# Patient Record
Sex: MALE | Race: WHITE | NOT HISPANIC OR LATINO | Employment: FULL TIME | URBAN - METROPOLITAN AREA
[De-identification: names, ages, dates, MRNs, and addresses within clinical notes are randomized per-mention and may not be internally consistent; named-entity substitution may affect disease eponyms.]

---

## 2023-06-12 ENCOUNTER — LAB (OUTPATIENT)
Dept: LAB | Facility: LAB | Age: 63
End: 2023-06-12
Payer: COMMERCIAL

## 2023-06-12 ENCOUNTER — OFFICE VISIT (OUTPATIENT)
Dept: PRIMARY CARE | Facility: CLINIC | Age: 63
End: 2023-06-12
Payer: COMMERCIAL

## 2023-06-12 VITALS
SYSTOLIC BLOOD PRESSURE: 138 MMHG | HEART RATE: 68 BPM | HEIGHT: 74 IN | BODY MASS INDEX: 29.63 KG/M2 | WEIGHT: 230.9 LBS | DIASTOLIC BLOOD PRESSURE: 84 MMHG

## 2023-06-12 DIAGNOSIS — E78.49 OTHER HYPERLIPIDEMIA: ICD-10-CM

## 2023-06-12 DIAGNOSIS — R73.03 PREDIABETES: Primary | ICD-10-CM

## 2023-06-12 DIAGNOSIS — I10 ESSENTIAL HYPERTENSION: ICD-10-CM

## 2023-06-12 DIAGNOSIS — R73.03 PREDIABETES: ICD-10-CM

## 2023-06-12 PROBLEM — N40.0 BENIGN PROSTATIC HYPERPLASIA WITHOUT LOWER URINARY TRACT SYMPTOMS: Status: ACTIVE | Noted: 2023-06-12

## 2023-06-12 PROBLEM — H43.812 POSTERIOR VITREOUS DETACHMENT OF LEFT EYE: Status: ACTIVE | Noted: 2020-09-15

## 2023-06-12 PROBLEM — E78.5 HYPERLIPIDEMIA: Status: ACTIVE | Noted: 2021-02-03

## 2023-06-12 PROBLEM — H43.812 POSTERIOR VITREOUS DETACHMENT OF LEFT EYE: Status: RESOLVED | Noted: 2020-09-15 | Resolved: 2023-06-12

## 2023-06-12 PROBLEM — R45.89 FEELING DOWN: Status: ACTIVE | Noted: 2023-06-12

## 2023-06-12 LAB
ANION GAP IN SER/PLAS: 10 MMOL/L (ref 10–20)
CALCIUM (MG/DL) IN SER/PLAS: 9.5 MG/DL (ref 8.6–10.3)
CARBON DIOXIDE, TOTAL (MMOL/L) IN SER/PLAS: 28 MMOL/L (ref 21–32)
CHLORIDE (MMOL/L) IN SER/PLAS: 103 MMOL/L (ref 98–107)
CREATININE (MG/DL) IN SER/PLAS: 1.27 MG/DL (ref 0.5–1.3)
GFR MALE: 64 ML/MIN/1.73M2
GLUCOSE (MG/DL) IN SER/PLAS: 107 MG/DL (ref 74–99)
POTASSIUM (MMOL/L) IN SER/PLAS: 4.2 MMOL/L (ref 3.5–5.3)
SODIUM (MMOL/L) IN SER/PLAS: 137 MMOL/L (ref 136–145)
UREA NITROGEN (MG/DL) IN SER/PLAS: 23 MG/DL (ref 6–23)

## 2023-06-12 PROCEDURE — 3075F SYST BP GE 130 - 139MM HG: CPT | Performed by: FAMILY MEDICINE

## 2023-06-12 PROCEDURE — 80048 BASIC METABOLIC PNL TOTAL CA: CPT

## 2023-06-12 PROCEDURE — 83036 HEMOGLOBIN GLYCOSYLATED A1C: CPT

## 2023-06-12 PROCEDURE — 36415 COLL VENOUS BLD VENIPUNCTURE: CPT

## 2023-06-12 PROCEDURE — 3079F DIAST BP 80-89 MM HG: CPT | Performed by: FAMILY MEDICINE

## 2023-06-12 PROCEDURE — 99214 OFFICE O/P EST MOD 30 MIN: CPT | Performed by: FAMILY MEDICINE

## 2023-06-12 PROCEDURE — 1036F TOBACCO NON-USER: CPT | Performed by: FAMILY MEDICINE

## 2023-06-12 RX ORDER — TRIAMCINOLONE ACETONIDE 1 MG/G
CREAM TOPICAL
COMMUNITY
Start: 2019-11-14

## 2023-06-12 RX ORDER — CALCIUM CARBONATE/VITAMIN D3 500-10/5ML
1 LIQUID (ML) ORAL
COMMUNITY

## 2023-06-12 NOTE — PROGRESS NOTES
Patient presents for periodic surveillance of chronic medical problems.     Subjective  Ludin Hart is a 63 y.o. male who presents for Annual Exam.  HPI  Prediabetes, getting labs today to reassess  Hyperlipidemia on statin  Mood disorder, on wellbutrin, stable  HTN, stable  Denies problems or concerns  Discussed screening for colon cancer, chooses FOBT due after 7/15.  Got first Shingrix vaccine.  Review of Systems   All other systems reviewed and are negative.  .    Objective     Visit Vitals  /84 (BP Location: Left arm, Patient Position: Sitting)   Pulse 68      Physical Exam  Vitals and nursing note reviewed.   Constitutional:       General: He is not in acute distress.     Appearance: Normal appearance. He is not toxic-appearing.   HENT:      Head: Normocephalic and atraumatic.      Right Ear: External ear normal.   Cardiovascular:      Rate and Rhythm: Normal rate and regular rhythm.      Heart sounds: No murmur heard.  Pulmonary:      Effort: Pulmonary effort is normal.      Breath sounds: Normal breath sounds.   Abdominal:      Palpations: Abdomen is soft.   Musculoskeletal:      Comments: Normal gait   Skin:     General: Skin is warm and dry.   Neurological:      General: No focal deficit present.      Mental Status: He is alert and oriented to person, place, and time.   Psychiatric:         Mood and Affect: Mood normal.         Behavior: Behavior normal.         Assessment/Plan   Problem List Items Addressed This Visit          Circulatory    Essential hypertension       Endocrine/Metabolic    Prediabetes - Primary    Relevant Orders    Basic Metabolic Panel    Hemoglobin A1C    CBC and Auto Differential    Comprehensive Metabolic Panel    Lipid Panel    Urinalysis Microscopic Only    Albumin , Urine Random    Vitamin B12    Hemoglobin A1C       Other    Hyperlipidemia    Relevant Orders    CBC and Auto Differential    Comprehensive Metabolic Panel    Lipid Panel    Urinalysis Microscopic Only     Albumin , Urine Random    Vitamin B12    Hemoglobin A1C              Janny Gill MD

## 2023-06-13 ENCOUNTER — TELEPHONE (OUTPATIENT)
Dept: PRIMARY CARE | Facility: CLINIC | Age: 63
End: 2023-06-13
Payer: COMMERCIAL

## 2023-06-13 LAB
ESTIMATED AVERAGE GLUCOSE FOR HBA1C: 120 MG/DL
HEMOGLOBIN A1C/HEMOGLOBIN TOTAL IN BLOOD: 5.8 %

## 2023-06-13 NOTE — TELEPHONE ENCOUNTER
----- Message from Janny Gill MD sent at 6/13/2023  2:12 PM EDT -----  Can you please let him know labs look good, A1c is 5.8, lower end of prediabetes, same as last time so it hasn't gone up  Kidney function is actually a little better than last time.  Recommend we check again with all his labs in six months.

## 2023-08-12 ENCOUNTER — TELEPHONE (OUTPATIENT)
Dept: PRIMARY CARE | Facility: CLINIC | Age: 63
End: 2023-08-12
Payer: COMMERCIAL

## 2023-08-12 ENCOUNTER — DOCUMENTATION (OUTPATIENT)
Dept: PRIMARY CARE | Facility: CLINIC | Age: 63
End: 2023-08-12
Payer: COMMERCIAL

## 2023-08-18 ENCOUNTER — CLINICAL SUPPORT (OUTPATIENT)
Dept: PRIMARY CARE | Facility: CLINIC | Age: 63
End: 2023-08-18
Payer: COMMERCIAL

## 2023-08-18 DIAGNOSIS — Z12.11 SCREENING FOR COLON CANCER: ICD-10-CM

## 2023-08-18 LAB — POC FECAL OCCULT BLOOD IMMUNOASSAY: NEGATIVE

## 2023-08-18 PROCEDURE — 82274 ASSAY TEST FOR BLOOD FECAL: CPT | Performed by: FAMILY MEDICINE

## 2023-08-18 NOTE — LETTER
August 18, 2023     Ludin Hart  [address] [place] [id number]    Ibrahima Noland,     Here are your test results-    Resulted Orders   POCT Fecal Occult Immunoassay-ifob manually resulted   Result Value Ref Range    POC Fecal Occult Blood Immunoassay Negative Negative      The current recommendation is to repeat this in a year.  Please let me know if you have any questions.         Sincerely,        Josseline Anderson CMA

## 2023-08-21 LAB — FECAL OCCULT BLD IMMUNOASSAY EXXTERNAL: NEGATIVE

## 2023-10-03 DIAGNOSIS — Z00.00 ENCOUNTER FOR GENERAL ADULT MEDICAL EXAMINATION WITHOUT ABNORMAL FINDINGS: ICD-10-CM

## 2023-10-04 RX ORDER — LOSARTAN POTASSIUM 100 MG/1
TABLET ORAL
Qty: 90 TABLET | Refills: 1 | Status: SHIPPED | OUTPATIENT
Start: 2023-10-04 | End: 2024-04-09

## 2023-12-07 ENCOUNTER — TELEPHONE (OUTPATIENT)
Dept: PRIMARY CARE | Facility: CLINIC | Age: 63
End: 2023-12-07

## 2023-12-07 ENCOUNTER — LAB (OUTPATIENT)
Dept: LAB | Facility: LAB | Age: 63
End: 2023-12-07
Payer: COMMERCIAL

## 2023-12-07 DIAGNOSIS — E78.49 OTHER HYPERLIPIDEMIA: ICD-10-CM

## 2023-12-07 DIAGNOSIS — R73.03 PREDIABETES: ICD-10-CM

## 2023-12-07 DIAGNOSIS — I10 ESSENTIAL HYPERTENSION: ICD-10-CM

## 2023-12-07 LAB
BASOPHILS # BLD AUTO: 0.07 X10*3/UL (ref 0–0.1)
BASOPHILS NFR BLD AUTO: 0.9 %
EOSINOPHIL # BLD AUTO: 0.2 X10*3/UL (ref 0–0.7)
EOSINOPHIL NFR BLD AUTO: 2.5 %
ERYTHROCYTE [DISTWIDTH] IN BLOOD BY AUTOMATED COUNT: 12.7 % (ref 11.5–14.5)
HCT VFR BLD AUTO: 46.8 % (ref 41–52)
HGB BLD-MCNC: 15.4 G/DL (ref 13.5–17.5)
IMM GRANULOCYTES # BLD AUTO: 0.06 X10*3/UL (ref 0–0.7)
IMM GRANULOCYTES NFR BLD AUTO: 0.7 % (ref 0–0.9)
LYMPHOCYTES # BLD AUTO: 2.4 X10*3/UL (ref 1.2–4.8)
LYMPHOCYTES NFR BLD AUTO: 29.7 %
MCH RBC QN AUTO: 31.2 PG (ref 26–34)
MCHC RBC AUTO-ENTMCNC: 32.9 G/DL (ref 32–36)
MCV RBC AUTO: 95 FL (ref 80–100)
MONOCYTES # BLD AUTO: 0.89 X10*3/UL (ref 0.1–1)
MONOCYTES NFR BLD AUTO: 11 %
MUCOUS THREADS #/AREA URNS AUTO: NORMAL /LPF
NEUTROPHILS # BLD AUTO: 4.45 X10*3/UL (ref 1.2–7.7)
NEUTROPHILS NFR BLD AUTO: 55.2 %
NRBC BLD-RTO: 0 /100 WBCS (ref 0–0)
PLATELET # BLD AUTO: 330 X10*3/UL (ref 150–450)
RBC # BLD AUTO: 4.93 X10*6/UL (ref 4.5–5.9)
RBC #/AREA URNS AUTO: NORMAL /HPF
TSH SERPL-ACNC: 1.49 MIU/L (ref 0.44–3.98)
VIT B12 SERPL-MCNC: 565 PG/ML (ref 211–911)
WBC # BLD AUTO: 8.1 X10*3/UL (ref 4.4–11.3)
WBC #/AREA URNS AUTO: NORMAL /HPF

## 2023-12-07 PROCEDURE — 80061 LIPID PANEL: CPT

## 2023-12-07 PROCEDURE — 82607 VITAMIN B-12: CPT

## 2023-12-07 PROCEDURE — 84443 ASSAY THYROID STIM HORMONE: CPT

## 2023-12-07 PROCEDURE — 85025 COMPLETE CBC W/AUTO DIFF WBC: CPT

## 2023-12-07 PROCEDURE — 81001 URINALYSIS AUTO W/SCOPE: CPT

## 2023-12-07 PROCEDURE — 83036 HEMOGLOBIN GLYCOSYLATED A1C: CPT

## 2023-12-07 PROCEDURE — 36415 COLL VENOUS BLD VENIPUNCTURE: CPT

## 2023-12-07 NOTE — TELEPHONE ENCOUNTER
"Lab LM about pts urine sample taken at lab today; states only a \"microscopic\" is ordered; not the UA wants to verify that's really all the doctor wants? If not needs new order put in for UA with microscopic. Please advise.  "

## 2023-12-11 ENCOUNTER — OFFICE VISIT (OUTPATIENT)
Dept: PRIMARY CARE | Facility: CLINIC | Age: 63
End: 2023-12-11
Payer: COMMERCIAL

## 2023-12-11 VITALS
SYSTOLIC BLOOD PRESSURE: 120 MMHG | HEIGHT: 74 IN | BODY MASS INDEX: 30.42 KG/M2 | DIASTOLIC BLOOD PRESSURE: 70 MMHG | HEART RATE: 72 BPM | WEIGHT: 237 LBS

## 2023-12-11 DIAGNOSIS — Z28.21 INFLUENZA VACCINATION DECLINED: ICD-10-CM

## 2023-12-11 DIAGNOSIS — I10 ESSENTIAL HYPERTENSION: ICD-10-CM

## 2023-12-11 DIAGNOSIS — E78.49 OTHER HYPERLIPIDEMIA: ICD-10-CM

## 2023-12-11 DIAGNOSIS — R73.03 PREDIABETES: Primary | ICD-10-CM

## 2023-12-11 PROBLEM — R45.89 FEELING DOWN: Status: RESOLVED | Noted: 2023-06-12 | Resolved: 2023-12-11

## 2023-12-11 LAB
CHOLEST SERPL-MCNC: 160 MG/DL (ref 0–199)
CHOLESTEROL/HDL RATIO: 3
EST. AVERAGE GLUCOSE BLD GHB EST-MCNC: 123 MG/DL
HBA1C MFR BLD: 5.9 %
HDLC SERPL-MCNC: 54 MG/DL
LDLC SERPL CALC-MCNC: 89 MG/DL
NON HDL CHOLESTEROL: 106 MG/DL (ref 0–149)
TRIGL SERPL-MCNC: 85 MG/DL (ref 0–149)
VLDL: 17 MG/DL (ref 0–40)

## 2023-12-11 PROCEDURE — 1036F TOBACCO NON-USER: CPT | Performed by: FAMILY MEDICINE

## 2023-12-11 PROCEDURE — 3078F DIAST BP <80 MM HG: CPT | Performed by: FAMILY MEDICINE

## 2023-12-11 PROCEDURE — 3074F SYST BP LT 130 MM HG: CPT | Performed by: FAMILY MEDICINE

## 2023-12-11 PROCEDURE — 99214 OFFICE O/P EST MOD 30 MIN: CPT | Performed by: FAMILY MEDICINE

## 2023-12-11 NOTE — PROGRESS NOTES
Patient presents for periodic surveillance of chronic medical problems.     Subjective  Ludin Hart is a 63 y.o. male who presents for No chief complaint on file..  HPI  Prediabetes, A1c pending  Hyperlipidemia, on statin, lipid pending  HTN, excellent control, recheck with large cuff left arm similar reading  Gets regular exercise, declines influenza vaccine.  Off wellbutrin, feels fine.   Review of Systems   All other systems reviewed and are negative.  .    Objective     Visit Vitals  /70 (BP Location: Left arm, Patient Position: Sitting)   Pulse 72      Physical Exam  Vitals and nursing note reviewed.   Constitutional:       General: He is not in acute distress.     Appearance: Normal appearance. He is not toxic-appearing.   HENT:      Head: Normocephalic and atraumatic.   Cardiovascular:      Rate and Rhythm: Normal rate and regular rhythm.      Heart sounds: No murmur heard.  Pulmonary:      Effort: Pulmonary effort is normal.      Breath sounds: Normal breath sounds.   Musculoskeletal:      Cervical back: Neck supple. No rigidity.      Comments: Normal gait   Skin:     General: Skin is warm and dry.   Neurological:      General: No focal deficit present.      Mental Status: He is alert and oriented to person, place, and time.   Psychiatric:         Mood and Affect: Mood normal.         Behavior: Behavior normal.       Office Visit on 12/11/2023   Component Date Value Ref Range Status    Fecal Occult Blood Immunoassay Ext* 08/18/2023 Negative   In process   Lab on 12/07/2023   Component Date Value Ref Range Status    WBC 12/07/2023 8.1  4.4 - 11.3 x10*3/uL Final    nRBC 12/07/2023 0.0  0.0 - 0.0 /100 WBCs Final    RBC 12/07/2023 4.93  4.50 - 5.90 x10*6/uL Final    Hemoglobin 12/07/2023 15.4  13.5 - 17.5 g/dL Final    Hematocrit 12/07/2023 46.8  41.0 - 52.0 % Final    MCV 12/07/2023 95  80 - 100 fL Final    MCH 12/07/2023 31.2  26.0 - 34.0 pg Final    MCHC 12/07/2023 32.9  32.0 - 36.0 g/dL Final    RDW  12/07/2023 12.7  11.5 - 14.5 % Final    Platelets 12/07/2023 330  150 - 450 x10*3/uL Final    Neutrophils % 12/07/2023 55.2  40.0 - 80.0 % Final    Immature Granulocytes %, Automated 12/07/2023 0.7  0.0 - 0.9 % Final    Immature Granulocyte Count (IG) includes promyelocytes, myelocytes and metamyelocytes but does not include bands. Percent differential counts (%) should be interpreted in the context of the absolute cell counts (cells/UL).    Lymphocytes % 12/07/2023 29.7  13.0 - 44.0 % Final    Monocytes % 12/07/2023 11.0  2.0 - 10.0 % Final    Eosinophils % 12/07/2023 2.5  0.0 - 6.0 % Final    Basophils % 12/07/2023 0.9  0.0 - 2.0 % Final    Neutrophils Absolute 12/07/2023 4.45  1.20 - 7.70 x10*3/uL Final    Percent differential counts (%) should be interpreted in the context of the absolute cell counts (cells/uL).    Immature Granulocytes Absolute, Au* 12/07/2023 0.06  0.00 - 0.70 x10*3/uL Final    Lymphocytes Absolute 12/07/2023 2.40  1.20 - 4.80 x10*3/uL Final    Monocytes Absolute 12/07/2023 0.89  0.10 - 1.00 x10*3/uL Final    Eosinophils Absolute 12/07/2023 0.20  0.00 - 0.70 x10*3/uL Final    Basophils Absolute 12/07/2023 0.07  0.00 - 0.10 x10*3/uL Final    WBC, Urine 12/07/2023 1-5  1-5, NONE /HPF Final    RBC, Urine 12/07/2023 1-2  NONE, 1-2, 3-5 /HPF Final    Mucus, Urine 12/07/2023 1+  Reference range not established. /LPF Final    Vitamin B12 12/07/2023 565  211 - 911 pg/mL Final    Thyroid Stimulating Hormone 12/07/2023 1.49  0.44 - 3.98 mIU/L Final         Assessment/Plan   Problem List Items Addressed This Visit       Essential hypertension    Relevant Orders    TSH with reflex to Free T4 if abnormal (Completed)    Hyperlipidemia    Prediabetes - Primary    Relevant Orders    TSH with reflex to Free T4 if abnormal (Completed)    Hemoglobin A1C    Lipid Panel     Other Visit Diagnoses       Influenza vaccination declined                       Janny Gill MD

## 2023-12-19 DIAGNOSIS — R35.1 NOCTURIA: ICD-10-CM

## 2023-12-30 DIAGNOSIS — E78.5 HYPERLIPIDEMIA, UNSPECIFIED: ICD-10-CM

## 2024-01-02 RX ORDER — ATORVASTATIN CALCIUM 20 MG/1
TABLET, FILM COATED ORAL
Qty: 90 TABLET | Refills: 1 | Status: SHIPPED | OUTPATIENT
Start: 2024-01-02

## 2024-01-16 ENCOUNTER — LAB (OUTPATIENT)
Dept: LAB | Facility: LAB | Age: 64
End: 2024-01-16
Payer: COMMERCIAL

## 2024-01-16 DIAGNOSIS — R35.1 NOCTURIA: ICD-10-CM

## 2024-01-16 LAB — PSA SERPL-MCNC: 0.97 NG/ML

## 2024-01-16 PROCEDURE — 84153 ASSAY OF PSA TOTAL: CPT

## 2024-01-16 PROCEDURE — 36415 COLL VENOUS BLD VENIPUNCTURE: CPT

## 2024-01-17 ENCOUNTER — OFFICE VISIT (OUTPATIENT)
Dept: UROLOGY | Facility: CLINIC | Age: 64
End: 2024-01-17
Payer: COMMERCIAL

## 2024-01-17 VITALS — RESPIRATION RATE: 16 BRPM | WEIGHT: 271 LBS | BODY MASS INDEX: 34.79 KG/M2

## 2024-01-17 DIAGNOSIS — R35.1 NOCTURIA: ICD-10-CM

## 2024-01-17 DIAGNOSIS — N40.0 BENIGN PROSTATIC HYPERPLASIA WITHOUT LOWER URINARY TRACT SYMPTOMS: ICD-10-CM

## 2024-01-17 PROCEDURE — 1036F TOBACCO NON-USER: CPT | Performed by: UROLOGY

## 2024-01-17 PROCEDURE — 99214 OFFICE O/P EST MOD 30 MIN: CPT | Performed by: UROLOGY

## 2024-01-17 RX ORDER — TADALAFIL 5 MG/1
5 TABLET ORAL DAILY
Qty: 30 TABLET | Refills: 11 | Status: SHIPPED | OUTPATIENT
Start: 2024-01-17 | End: 2024-06-10 | Stop reason: ALTCHOICE

## 2024-01-17 ASSESSMENT — ENCOUNTER SYMPTOMS
FEVER: 0
SHORTNESS OF BREATH: 0
ENDOCRINE NEGATIVE: 1
NAUSEA: 0
CHILLS: 0
EYES NEGATIVE: 1
DIFFICULTY URINATING: 0
ALLERGIC/IMMUNOLOGIC NEGATIVE: 1
PSYCHIATRIC NEGATIVE: 1
COUGH: 0

## 2024-01-17 NOTE — PROGRESS NOTES
Subjective   Patient ID: Ludin Hart is a 64 y.o. male.    HPI  Patient is here for yearly f/u. Most recent PSA was 0.97 on 1/24. Prior PSA was 0.82 on 1/23... Prior PSA was 1.18 on 1/22. BPH sx are mild and stable...no frequency or urgency..some hesitancy and weak stream in the morning sometimes. Denies dysuria and hematuria.. Nocturia x0-1.. Caffeine does worsen LUTs....Hx of testicular swelling..No bigger or smaller then last time..No pain...he did have a scrotal U/S done 11/19 which did show bilateral hydroceles. ED is mild. . No medication for this.          Review of Systems   Constitutional:  Negative for chills and fever.   HENT: Negative.     Eyes: Negative.    Respiratory:  Negative for cough and shortness of breath.    Cardiovascular:  Negative for chest pain and leg swelling.   Gastrointestinal:  Negative for nausea.   Endocrine: Negative.    Genitourinary:  Negative for difficulty urinating.        Negative except for documented in HPI   Allergic/Immunologic: Negative.    Neurological:         Alert & oriented X 3   Hematological:         Denies blood thinners   Psychiatric/Behavioral: Negative.         Objective   Physical Exam  Vitals and nursing note reviewed.   Constitutional:       General: He is not in acute distress.     Appearance: Normal appearance.   Pulmonary:      Effort: Pulmonary effort is normal.   Abdominal:      Tenderness: There is no abdominal tenderness.   Genitourinary:     Comments: Kidneys non palpable bilaterally  Bladder non palpable or tender  Scrotum no mass, No hydrocele  Epididymis- No spermatocele. Non Tender.  Testicles: No mass. WNL  Urethra: No discharge  Penis within normal limits... No lesions. Circumcised  Prostate - symmetric, no nodules. BENIGN  Seminal Vesicals: No mass.  Sphincter tone: normal  Neurological:      Mental Status: He is alert.         Assessment/Plan       Diagnoses and all orders for this visit:  Benign prostatic hyperplasia without lower urinary  tract symptoms  Nocturia      All available PSA values reviewed, Options discussed. Questions answered.   Diet changes for prostate health discussed and educational information given. Pros/Cons of prostate health supplements discussed.   Treatment options for LUTS reviewed  Cialis 5mg Rx given  Discussed timed voiding. Discussed fluid and caffeine intake  Treatment options for ED reviewed.  Lifestyle change to help prevent UTIs discussed. Encouraged fluid intake.  pros/cons of Testosterone replacement reviewed. Replacement options discussed. Questions answered. Available levels reviewed.      F/U with PSA 1 year

## 2024-01-31 DIAGNOSIS — Z91.89 OTHER SPECIFIED PERSONAL RISK FACTORS, NOT ELSEWHERE CLASSIFIED: ICD-10-CM

## 2024-01-31 RX ORDER — TRIAMTERENE AND HYDROCHLOROTHIAZIDE 37.5; 25 MG/1; MG/1
CAPSULE ORAL
Qty: 90 CAPSULE | Refills: 1 | Status: SHIPPED | OUTPATIENT
Start: 2024-01-31

## 2024-04-07 DIAGNOSIS — Z00.00 ENCOUNTER FOR GENERAL ADULT MEDICAL EXAMINATION WITHOUT ABNORMAL FINDINGS: ICD-10-CM

## 2024-04-09 RX ORDER — LOSARTAN POTASSIUM 100 MG/1
TABLET ORAL
Qty: 90 TABLET | Refills: 1 | Status: SHIPPED | OUTPATIENT
Start: 2024-04-09

## 2024-06-06 ENCOUNTER — LAB (OUTPATIENT)
Dept: LAB | Facility: LAB | Age: 64
End: 2024-06-06
Payer: COMMERCIAL

## 2024-06-06 DIAGNOSIS — R73.03 PREDIABETES: ICD-10-CM

## 2024-06-06 LAB
ANION GAP SERPL CALC-SCNC: 12 MMOL/L (ref 10–20)
BUN SERPL-MCNC: 25 MG/DL (ref 6–23)
CALCIUM SERPL-MCNC: 9.3 MG/DL (ref 8.6–10.3)
CHLORIDE SERPL-SCNC: 102 MMOL/L (ref 98–107)
CO2 SERPL-SCNC: 27 MMOL/L (ref 21–32)
CREAT SERPL-MCNC: 1.2 MG/DL (ref 0.5–1.3)
EGFRCR SERPLBLD CKD-EPI 2021: 68 ML/MIN/1.73M*2
EST. AVERAGE GLUCOSE BLD GHB EST-MCNC: 120 MG/DL
GLUCOSE SERPL-MCNC: 90 MG/DL (ref 74–99)
HBA1C MFR BLD: 5.8 %
POTASSIUM SERPL-SCNC: 4.2 MMOL/L (ref 3.5–5.3)
SODIUM SERPL-SCNC: 137 MMOL/L (ref 136–145)

## 2024-06-06 PROCEDURE — 80048 BASIC METABOLIC PNL TOTAL CA: CPT

## 2024-06-06 PROCEDURE — 36415 COLL VENOUS BLD VENIPUNCTURE: CPT

## 2024-06-06 PROCEDURE — 83036 HEMOGLOBIN GLYCOSYLATED A1C: CPT

## 2024-06-09 ASSESSMENT — ENCOUNTER SYMPTOMS
HEADACHES: 0
SWEATS: 0
SHORTNESS OF BREATH: 0
PND: 0
ORTHOPNEA: 0
PALPITATIONS: 0
BLURRED VISION: 0
HYPERTENSION: 1
NECK PAIN: 0

## 2024-06-10 ENCOUNTER — OFFICE VISIT (OUTPATIENT)
Dept: PRIMARY CARE | Facility: CLINIC | Age: 64
End: 2024-06-10
Payer: COMMERCIAL

## 2024-06-10 VITALS
HEART RATE: 76 BPM | BODY MASS INDEX: 30.16 KG/M2 | DIASTOLIC BLOOD PRESSURE: 80 MMHG | SYSTOLIC BLOOD PRESSURE: 134 MMHG | HEIGHT: 74 IN | WEIGHT: 235 LBS

## 2024-06-10 DIAGNOSIS — R73.03 PREDIABETES: Primary | ICD-10-CM

## 2024-06-10 DIAGNOSIS — E78.49 OTHER HYPERLIPIDEMIA: ICD-10-CM

## 2024-06-10 DIAGNOSIS — I10 ESSENTIAL HYPERTENSION: ICD-10-CM

## 2024-06-10 PROCEDURE — 3075F SYST BP GE 130 - 139MM HG: CPT | Performed by: FAMILY MEDICINE

## 2024-06-10 PROCEDURE — 3079F DIAST BP 80-89 MM HG: CPT | Performed by: FAMILY MEDICINE

## 2024-06-10 PROCEDURE — 1036F TOBACCO NON-USER: CPT | Performed by: FAMILY MEDICINE

## 2024-06-10 PROCEDURE — 99214 OFFICE O/P EST MOD 30 MIN: CPT | Performed by: FAMILY MEDICINE

## 2024-06-10 ASSESSMENT — ENCOUNTER SYMPTOMS
HEADACHES: 0
ORTHOPNEA: 0
NECK PAIN: 0
PALPITATIONS: 0
BLURRED VISION: 0
SHORTNESS OF BREATH: 0
PND: 0
HYPERTENSION: 1
SWEATS: 0

## 2024-06-10 NOTE — PROGRESS NOTES
Patient presents for periodic surveillance of chronic medical problems.     Subjective  Ludin Hart is a 64 y.o. male who presents for Annual Exam.  Hypertension  The current episode started more than 1 year ago. The problem is unchanged. The problem is controlled. Pertinent negatives include no anxiety, blurred vision, chest pain, headaches, malaise/fatigue, neck pain, orthopnea, palpitations, peripheral edema, PND, shortness of breath or sweats. There are no associated agents to hypertension. There are no known risk factors for coronary artery disease. There are no compliance problems.    Htn, hyperlipidemia, prediabetes, have been stable  Labs suggest pre renal/mild dehydration , discussed that  Gets regular exercise.  Wonders about carotid doppler screening, advised insurance usually doesn't cover without symptoms suggesting disease, recommend lifeline type screening for that  Wonders about seeing an endocrinologist, feels metabolism may be abnormal, will let us know   No exertional chest pain or dyspnea.  States humidity at the gym bothers him more than it used to, and one back exercise he notices chest pressure but thinks its mechanical/positional, gets it with no other activity   Review of Systems   Constitutional:  Negative for malaise/fatigue.   Eyes:  Negative for blurred vision.   Respiratory:  Negative for shortness of breath.    Cardiovascular:  Negative for chest pain, palpitations, orthopnea and PND.   Musculoskeletal:  Negative for neck pain.   Neurological:  Negative for headaches.   .    Current Outpatient Medications:     atorvastatin (Lipitor) 20 mg tablet, TAKE 1 TABLET BY MOUTH EVERYDAY AT BEDTIME, Disp: 90 tablet, Rfl: 1    losartan (Cozaar) 100 mg tablet, TAKE 1 TABLET BY MOUTH EVERY DAY, Disp: 90 tablet, Rfl: 1    magnesium oxide 400 mg magnesium capsule, Take 1 tablet by mouth once daily., Disp: , Rfl:     triamcinolone (Kenalog) 0.1 % cream, Triamcinolone Acetonide 0.1 % External Cream  Quantity: 30 Refills: 0 Start : 14-Nov-2019 Active, Disp: , Rfl:     triamterene-hydrochlorothiazid (Dyazide) 37.5-25 mg capsule, TAKE 1 CAPSULE BY MOUTH EVERY DAY, Disp: 90 capsule, Rfl: 1    zinc acetate 50 mg (zinc) capsule, Take 50 mg by mouth once daily., Disp: , Rfl:    Patient Active Problem List   Diagnosis    Essential hypertension    Hyperlipidemia    Prediabetes    Benign prostatic hyperplasia without lower urinary tract symptoms      Lab on 06/06/2024   Component Date Value Ref Range Status    Hemoglobin A1C 06/06/2024 5.8 (H)  see below % Final    Estimated Average Glucose 06/06/2024 120  Not Established mg/dL Final    Glucose 06/06/2024 90  74 - 99 mg/dL Final    Sodium 06/06/2024 137  136 - 145 mmol/L Final    Potassium 06/06/2024 4.2  3.5 - 5.3 mmol/L Final    Chloride 06/06/2024 102  98 - 107 mmol/L Final    Bicarbonate 06/06/2024 27  21 - 32 mmol/L Final    Anion Gap 06/06/2024 12  10 - 20 mmol/L Final    Urea Nitrogen 06/06/2024 25 (H)  6 - 23 mg/dL Final    Creatinine 06/06/2024 1.20  0.50 - 1.30 mg/dL Final    eGFR 06/06/2024 68  >60 mL/min/1.73m*2 Final    Calculations of estimated GFR are performed using the 2021 CKD-EPI Study Refit equation without the race variable for the IDMS-Traceable creatinine methods.  https://jasn.asnjournals.org/content/early/2021/09/22/ASN.2168488071    Calcium 06/06/2024 9.3  8.6 - 10.3 mg/dL Final      Objective     Visit Vitals  /80 (BP Location: Left arm, Patient Position: Sitting)   Pulse 76      Physical Exam    Assessment/Plan   Problem List Items Addressed This Visit       Essential hypertension    Hyperlipidemia    Prediabetes - Primary    Relevant Orders    Hemoglobin A1C (Completed)    Basic Metabolic Panel (Completed)    CBC and Auto Differential    Comprehensive Metabolic Panel    Hemoglobin A1C    Lipid Panel    TSH with reflex to Free T4 if abnormal    Albumin , Urine Random    Microscopic Only, Urine    Vitamin B12        Follow up six months,  labs prior.  Call concerns.     Dispensed fobt for screening.   Janny Gill MD

## 2024-06-30 DIAGNOSIS — E78.5 HYPERLIPIDEMIA, UNSPECIFIED: ICD-10-CM

## 2024-07-01 RX ORDER — ATORVASTATIN CALCIUM 20 MG/1
TABLET, FILM COATED ORAL
Qty: 90 TABLET | Refills: 1 | Status: SHIPPED | OUTPATIENT
Start: 2024-07-01

## 2024-07-17 DIAGNOSIS — Z91.89 OTHER SPECIFIED PERSONAL RISK FACTORS, NOT ELSEWHERE CLASSIFIED: ICD-10-CM

## 2024-07-17 RX ORDER — TRIAMTERENE AND HYDROCHLOROTHIAZIDE 37.5; 25 MG/1; MG/1
CAPSULE ORAL
Qty: 90 CAPSULE | Refills: 3 | Status: SHIPPED | OUTPATIENT
Start: 2024-07-17

## 2024-10-04 DIAGNOSIS — Z00.00 ENCOUNTER FOR GENERAL ADULT MEDICAL EXAMINATION WITHOUT ABNORMAL FINDINGS: ICD-10-CM

## 2024-10-04 RX ORDER — LOSARTAN POTASSIUM 100 MG/1
TABLET ORAL
Qty: 90 TABLET | Refills: 1 | Status: SHIPPED | OUTPATIENT
Start: 2024-10-04

## 2024-11-13 ENCOUNTER — CLINICAL SUPPORT (OUTPATIENT)
Age: 64
End: 2024-11-13
Payer: COMMERCIAL

## 2024-11-13 DIAGNOSIS — Z12.11 SCREENING FOR COLON CANCER: ICD-10-CM

## 2024-11-13 LAB — POC FECAL OCCULT BLOOD IMMUNOASSAY: NEGATIVE

## 2024-11-13 PROCEDURE — 82274 ASSAY TEST FOR BLOOD FECAL: CPT | Performed by: FAMILY MEDICINE

## 2024-12-04 ENCOUNTER — TELEPHONE (OUTPATIENT)
Age: 64
End: 2024-12-04
Payer: COMMERCIAL

## 2024-12-04 DIAGNOSIS — R10.13 EPIGASTRIC PAIN: ICD-10-CM

## 2024-12-04 DIAGNOSIS — K44.9 HIATAL HERNIA: Primary | ICD-10-CM

## 2024-12-05 ENCOUNTER — LAB (OUTPATIENT)
Facility: LAB | Age: 64
End: 2024-12-05
Payer: COMMERCIAL

## 2024-12-05 DIAGNOSIS — R73.03 PREDIABETES: ICD-10-CM

## 2024-12-05 LAB
ALBUMIN SERPL BCP-MCNC: 4.4 G/DL (ref 3.4–5)
ALP SERPL-CCNC: 89 U/L (ref 33–136)
ALT SERPL W P-5'-P-CCNC: 36 U/L (ref 10–52)
ANION GAP SERPL CALC-SCNC: 10 MMOL/L (ref 10–20)
AST SERPL W P-5'-P-CCNC: 26 U/L (ref 9–39)
BACTERIA #/AREA URNS AUTO: ABNORMAL /HPF
BASOPHILS # BLD AUTO: 0.04 X10*3/UL (ref 0–0.1)
BASOPHILS NFR BLD AUTO: 0.5 %
BILIRUB SERPL-MCNC: 1.4 MG/DL (ref 0–1.2)
BUN SERPL-MCNC: 27 MG/DL (ref 6–23)
CALCIUM SERPL-MCNC: 9.7 MG/DL (ref 8.6–10.3)
CHLORIDE SERPL-SCNC: 101 MMOL/L (ref 98–107)
CHOLEST SERPL-MCNC: 135 MG/DL (ref 0–199)
CHOLESTEROL/HDL RATIO: 2.9
CO2 SERPL-SCNC: 29 MMOL/L (ref 21–32)
CREAT SERPL-MCNC: 1.31 MG/DL (ref 0.5–1.3)
CREAT UR-MCNC: 160.9 MG/DL (ref 20–370)
EGFRCR SERPLBLD CKD-EPI 2021: 61 ML/MIN/1.73M*2
EOSINOPHIL # BLD AUTO: 1.37 X10*3/UL (ref 0–0.7)
EOSINOPHIL NFR BLD AUTO: 18.3 %
ERYTHROCYTE [DISTWIDTH] IN BLOOD BY AUTOMATED COUNT: 13.1 % (ref 11.5–14.5)
EST. AVERAGE GLUCOSE BLD GHB EST-MCNC: 114 MG/DL
GLUCOSE SERPL-MCNC: 99 MG/DL (ref 74–99)
HBA1C MFR BLD: 5.6 %
HCT VFR BLD AUTO: 45.7 % (ref 41–52)
HDLC SERPL-MCNC: 47 MG/DL
HGB BLD-MCNC: 15.3 G/DL (ref 13.5–17.5)
HYALINE CASTS #/AREA URNS AUTO: ABNORMAL /LPF
IMM GRANULOCYTES # BLD AUTO: 0.03 X10*3/UL (ref 0–0.7)
IMM GRANULOCYTES NFR BLD AUTO: 0.4 % (ref 0–0.9)
LDLC SERPL CALC-MCNC: 69 MG/DL
LYMPHOCYTES # BLD AUTO: 1.96 X10*3/UL (ref 1.2–4.8)
LYMPHOCYTES NFR BLD AUTO: 26.2 %
MCH RBC QN AUTO: 31.3 PG (ref 26–34)
MCHC RBC AUTO-ENTMCNC: 33.5 G/DL (ref 32–36)
MCV RBC AUTO: 94 FL (ref 80–100)
MICROALBUMIN UR-MCNC: <7 MG/L
MICROALBUMIN/CREAT UR: NORMAL MG/G{CREAT}
MONOCYTES # BLD AUTO: 0.87 X10*3/UL (ref 0.1–1)
MONOCYTES NFR BLD AUTO: 11.6 %
NEUTROPHILS # BLD AUTO: 3.21 X10*3/UL (ref 1.2–7.7)
NEUTROPHILS NFR BLD AUTO: 43 %
NON HDL CHOLESTEROL: 88 MG/DL (ref 0–149)
NRBC BLD-RTO: 0 /100 WBCS (ref 0–0)
PLATELET # BLD AUTO: 309 X10*3/UL (ref 150–450)
POTASSIUM SERPL-SCNC: 4.3 MMOL/L (ref 3.5–5.3)
PROT SERPL-MCNC: 7 G/DL (ref 6.4–8.2)
RBC # BLD AUTO: 4.89 X10*6/UL (ref 4.5–5.9)
RBC #/AREA URNS AUTO: ABNORMAL /HPF
SODIUM SERPL-SCNC: 136 MMOL/L (ref 136–145)
TRIGL SERPL-MCNC: 95 MG/DL (ref 0–149)
VLDL: 19 MG/DL (ref 0–40)
WBC # BLD AUTO: 7.5 X10*3/UL (ref 4.4–11.3)
WBC #/AREA URNS AUTO: ABNORMAL /HPF

## 2024-12-05 PROCEDURE — 80053 COMPREHEN METABOLIC PANEL: CPT

## 2024-12-05 PROCEDURE — 82043 UR ALBUMIN QUANTITATIVE: CPT

## 2024-12-05 PROCEDURE — 85025 COMPLETE CBC W/AUTO DIFF WBC: CPT

## 2024-12-05 PROCEDURE — 82570 ASSAY OF URINE CREATININE: CPT

## 2024-12-05 PROCEDURE — 82607 VITAMIN B-12: CPT

## 2024-12-05 PROCEDURE — 81001 URINALYSIS AUTO W/SCOPE: CPT

## 2024-12-05 PROCEDURE — 36415 COLL VENOUS BLD VENIPUNCTURE: CPT

## 2024-12-05 PROCEDURE — 80061 LIPID PANEL: CPT

## 2024-12-05 PROCEDURE — 84443 ASSAY THYROID STIM HORMONE: CPT

## 2024-12-05 PROCEDURE — 83036 HEMOGLOBIN GLYCOSYLATED A1C: CPT

## 2024-12-06 LAB
TSH SERPL-ACNC: 2.07 MIU/L (ref 0.44–3.98)
VIT B12 SERPL-MCNC: 627 PG/ML (ref 211–911)

## 2024-12-09 ENCOUNTER — APPOINTMENT (OUTPATIENT)
Dept: PRIMARY CARE | Facility: CLINIC | Age: 64
End: 2024-12-09
Payer: COMMERCIAL

## 2024-12-09 VITALS
SYSTOLIC BLOOD PRESSURE: 120 MMHG | DIASTOLIC BLOOD PRESSURE: 68 MMHG | HEIGHT: 74 IN | BODY MASS INDEX: 30.54 KG/M2 | WEIGHT: 238 LBS | HEART RATE: 68 BPM

## 2024-12-09 DIAGNOSIS — R10.13 EPIGASTRIC PAIN: ICD-10-CM

## 2024-12-09 DIAGNOSIS — R11.2 NAUSEA AND VOMITING, UNSPECIFIED VOMITING TYPE: ICD-10-CM

## 2024-12-09 DIAGNOSIS — K52.9 POSTPRANDIAL DIARRHEA: ICD-10-CM

## 2024-12-09 DIAGNOSIS — E78.49 OTHER HYPERLIPIDEMIA: Primary | ICD-10-CM

## 2024-12-09 DIAGNOSIS — N20.0 KIDNEY STONE: ICD-10-CM

## 2024-12-09 DIAGNOSIS — R73.03 PREDIABETES: ICD-10-CM

## 2024-12-09 DIAGNOSIS — R10.13 POSTPRANDIAL EPIGASTRIC PAIN: ICD-10-CM

## 2024-12-09 DIAGNOSIS — I10 ESSENTIAL HYPERTENSION: ICD-10-CM

## 2024-12-09 PROCEDURE — 3074F SYST BP LT 130 MM HG: CPT | Performed by: FAMILY MEDICINE

## 2024-12-09 PROCEDURE — 1036F TOBACCO NON-USER: CPT | Performed by: FAMILY MEDICINE

## 2024-12-09 PROCEDURE — 3078F DIAST BP <80 MM HG: CPT | Performed by: FAMILY MEDICINE

## 2024-12-09 PROCEDURE — 3008F BODY MASS INDEX DOCD: CPT | Performed by: FAMILY MEDICINE

## 2024-12-09 PROCEDURE — 99214 OFFICE O/P EST MOD 30 MIN: CPT | Performed by: FAMILY MEDICINE

## 2024-12-09 ASSESSMENT — PATIENT HEALTH QUESTIONNAIRE - PHQ9
2. FEELING DOWN, DEPRESSED OR HOPELESS: NOT AT ALL
SUM OF ALL RESPONSES TO PHQ9 QUESTIONS 1 AND 2: 0
1. LITTLE INTEREST OR PLEASURE IN DOING THINGS: NOT AT ALL

## 2024-12-09 NOTE — PROGRESS NOTES
Patient presents for periodic surveillance of chronic medical problems.     Subjective  Ludin Hart is a 64 y.o. male who presents for Follow-up.  HPI  Htn, stable  Hyperlipidemia, excellent control on statin  Prediabete improved, normal A1c  Reviewed labs,mild elevation creatinine and bun c/w pre renal azotemia, on dyazide for htn  Has history of hiatal hernia, noted on ct 1/5/21.  States was having some pain in epigastric area with eating occasionally, hasn't had any recently.  No exertional symptoms, will monitor, if recurs /worsens, he'll let us know  Declines influenza vaccine.  Review of Systems   All other systems reviewed and are negative.  .    No Known Allergies    Current Outpatient Medications on File Prior to Visit   Medication Sig Dispense Refill    atorvastatin (Lipitor) 20 mg tablet TAKE 1 TABLET BY MOUTH EVERYDAY AT BEDTIME 90 tablet 1    losartan (Cozaar) 100 mg tablet TAKE 1 TABLET BY MOUTH EVERY DAY 90 tablet 1    magnesium oxide 400 mg magnesium capsule Take 1 tablet by mouth once daily.      triamcinolone (Kenalog) 0.1 % cream Triamcinolone Acetonide 0.1 % External Cream Quantity: 30 Refills: 0 Start : 14-Nov-2019 Active      triamterene-hydrochlorothiazid (Dyazide) 37.5-25 mg capsule TAKE 1 CAPSULE BY MOUTH EVERY DAY 90 capsule 3    zinc acetate 50 mg (zinc) capsule Take 50 mg by mouth once daily.       No current facility-administered medications on file prior to visit.       Patient Active Problem List   Diagnosis    Essential hypertension    Hyperlipidemia    Prediabetes    Benign prostatic hyperplasia without lower urinary tract symptoms       Objective     Visit Vitals  /68 (BP Location: Left arm, Patient Position: Sitting)   Pulse 68      Physical Exam  Vitals and nursing note reviewed.   Constitutional:       General: He is not in acute distress.     Appearance: Normal appearance. He is not toxic-appearing.   HENT:      Head: Normocephalic and atraumatic.   Cardiovascular:       Rate and Rhythm: Normal rate and regular rhythm.      Heart sounds: No murmur heard.  Pulmonary:      Effort: Pulmonary effort is normal.      Breath sounds: Normal breath sounds.   Musculoskeletal:      Cervical back: Neck supple. No rigidity.      Comments:     Skin:     General: Skin is warm and dry.   Neurological:      General: No focal deficit present.      Mental Status: He is alert and oriented to person, place, and time.   Psychiatric:         Mood and Affect: Mood normal.         Behavior: Behavior normal.       Lab on 12/05/2024   Component Date Value Ref Range Status    WBC 12/05/2024 7.5  4.4 - 11.3 x10*3/uL Final    nRBC 12/05/2024 0.0  0.0 - 0.0 /100 WBCs Final    RBC 12/05/2024 4.89  4.50 - 5.90 x10*6/uL Final    Hemoglobin 12/05/2024 15.3  13.5 - 17.5 g/dL Final    Hematocrit 12/05/2024 45.7  41.0 - 52.0 % Final    MCV 12/05/2024 94  80 - 100 fL Final    MCH 12/05/2024 31.3  26.0 - 34.0 pg Final    MCHC 12/05/2024 33.5  32.0 - 36.0 g/dL Final    RDW 12/05/2024 13.1  11.5 - 14.5 % Final    Platelets 12/05/2024 309  150 - 450 x10*3/uL Final    Neutrophils % 12/05/2024 43.0  40.0 - 80.0 % Final    Immature Granulocytes %, Automated 12/05/2024 0.4  0.0 - 0.9 % Final    Immature Granulocyte Count (IG) includes promyelocytes, myelocytes and metamyelocytes but does not include bands. Percent differential counts (%) should be interpreted in the context of the absolute cell counts (cells/UL).    Lymphocytes % 12/05/2024 26.2  13.0 - 44.0 % Final    Monocytes % 12/05/2024 11.6  2.0 - 10.0 % Final    Eosinophils % 12/05/2024 18.3  0.0 - 6.0 % Final    Basophils % 12/05/2024 0.5  0.0 - 2.0 % Final    Neutrophils Absolute 12/05/2024 3.21  1.20 - 7.70 x10*3/uL Final    Percent differential counts (%) should be interpreted in the context of the absolute cell counts (cells/uL).    Immature Granulocytes Absolute, Au* 12/05/2024 0.03  0.00 - 0.70 x10*3/uL Final    Lymphocytes Absolute 12/05/2024 1.96  1.20 - 4.80  x10*3/uL Final    Monocytes Absolute 12/05/2024 0.87  0.10 - 1.00 x10*3/uL Final    Eosinophils Absolute 12/05/2024 1.37 (H)  0.00 - 0.70 x10*3/uL Final    Basophils Absolute 12/05/2024 0.04  0.00 - 0.10 x10*3/uL Final    Glucose 12/05/2024 99  74 - 99 mg/dL Final    Sodium 12/05/2024 136  136 - 145 mmol/L Final    Potassium 12/05/2024 4.3  3.5 - 5.3 mmol/L Final    Chloride 12/05/2024 101  98 - 107 mmol/L Final    Bicarbonate 12/05/2024 29  21 - 32 mmol/L Final    Anion Gap 12/05/2024 10  10 - 20 mmol/L Final    Urea Nitrogen 12/05/2024 27 (H)  6 - 23 mg/dL Final    Creatinine 12/05/2024 1.31 (H)  0.50 - 1.30 mg/dL Final    eGFR 12/05/2024 61  >60 mL/min/1.73m*2 Final    Calculations of estimated GFR are performed using the 2021 CKD-EPI Study Refit equation without the race variable for the IDMS-Traceable creatinine methods.  https://jasn.asnjournals.org/content/early/2021/09/22/ASN.9441687064    Calcium 12/05/2024 9.7  8.6 - 10.3 mg/dL Final    Albumin 12/05/2024 4.4  3.4 - 5.0 g/dL Final    Alkaline Phosphatase 12/05/2024 89  33 - 136 U/L Final    Total Protein 12/05/2024 7.0  6.4 - 8.2 g/dL Final    AST 12/05/2024 26  9 - 39 U/L Final    Bilirubin, Total 12/05/2024 1.4 (H)  0.0 - 1.2 mg/dL Final    ALT 12/05/2024 36  10 - 52 U/L Final    Patients treated with Sulfasalazine may generate falsely decreased results for ALT.    Hemoglobin A1C 12/05/2024 5.6  See comment % Final    Estimated Average Glucose 12/05/2024 114  Not Established mg/dL Final    Cholesterol 12/05/2024 135  0 - 199 mg/dL Final          Age      Desirable   Borderline High   High     0-19 Y     0 - 169       170 - 199     >/= 200    20-24 Y     0 - 189       190 - 224     >/= 225         >24 Y     0 - 199       200 - 239     >/= 240   **All ranges are based on fasting samples. Specific   therapeutic targets will vary based on patient-specific   cardiac risk.    Pediatric guidelines reference:Pediatrics 2011, 128(S5).Adult guidelines reference:  NCEP ATPIII Guidelines,MIKE 2001, 258:2486-97    Venipuncture immediately after or during the administration of Metamizole may lead to falsely low results. Testing should be performed immediately prior to Metamizole dosing.    HDL-Cholesterol 12/05/2024 47.0  mg/dL Final      Age       Very Low   Low     Normal    High    0-19 Y    < 35      < 40     40-45     ----  20-24 Y    ----     < 40      >45      ----        >24 Y      ----     < 40     40-60      >60      Cholesterol/HDL Ratio 12/05/2024 2.9   Final      Ref Values  Desirable  < 3.4  High Risk  > 5.0    LDL Calculated 12/05/2024 69  <=99 mg/dL Final                                Near   Borderline      AGE      Desirable  Optimal    High     High     Very High     0-19 Y     0 - 109     ---    110-129   >/= 130     ----    20-24 Y     0 - 119     ---    120-159   >/= 160     ----      >24 Y     0 -  99   100-129  130-159   160-189     >/=190      VLDL 12/05/2024 19  0 - 40 mg/dL Final    Triglycerides 12/05/2024 95  0 - 149 mg/dL Final    Age              Desirable        Borderline         High        Very High  SEX:B           mg/dL             mg/dL               mg/dL      mg/dL  <=14D                       ----               ----        ----  15D-365D                    ----               ----        ----  1Y-9Y           0-74               75-99             >=100       ----  10Y-19Y        0-89                            >=130       ----  20Y-24Y        0-114             115-149             >=150      ----  >= 25Y         0-149             150-199             200-499    >=500      Venipuncture immediately after or during the administration of Metamizole may lead to falsely low results. Testing should be performed immediately prior to Metamizole dosing.    Non HDL Cholesterol 12/05/2024 88  0 - 149 mg/dL Final          Age       Desirable   Borderline High   High     Very High     0-19 Y     0 - 119       120 - 144     >/= 145    >/=  160    20-24 Y     0 - 149       150 - 189     >/= 190      ----         >24 Y    30 mg/dL above LDL Cholesterol goal      Thyroid Stimulating Hormone 12/05/2024 2.07  0.44 - 3.98 mIU/L Final    Vitamin B12 12/05/2024 627  211 - 911 pg/mL Final    Albumin, Urine Random 12/05/2024 <7.0  Not established mg/L Final    Creatinine, Urine Random 12/05/2024 160.9  20.0 - 370.0 mg/dL Final    Albumin/Creatinine Ratio 12/05/2024    Final    One or more analytes used in this calculation is outside of the analytical measurement range. Calculation cannot be performed.    WBC, Urine 12/05/2024 1-5  1-5, NONE /HPF Final    RBC, Urine 12/05/2024 1-2  NONE, 1-2, 3-5 /HPF Final    Bacteria, Urine 12/05/2024 1+ (A)  NONE SEEN /HPF Final    Hyaline Casts, Urine 12/05/2024 OCCASIONAL (A)  NONE /LPF Final   Clinical Support on 11/13/2024   Component Date Value Ref Range Status    POC Fecal Occult Blood Immunoassay 11/13/2024 Negative  Negative Final         Assessment/Plan   Problem List Items Addressed This Visit       Essential hypertension    Hyperlipidemia - Primary    Prediabetes    Relevant Orders    Comprehensive Metabolic Panel    Hemoglobin A1C        Follow up six months, labs prior.        Janny Gill MD

## 2024-12-10 NOTE — TELEPHONE ENCOUNTER
BOYD stating he had a bad episode of the stomach issues he discussed with DIAMOND yesterday. Wonders if DIAMOND could call him directly to discuss this?

## 2024-12-11 ENCOUNTER — LAB (OUTPATIENT)
Dept: LAB | Facility: LAB | Age: 64
End: 2024-12-11
Payer: COMMERCIAL

## 2024-12-11 ENCOUNTER — HOSPITAL ENCOUNTER (OUTPATIENT)
Dept: RADIOLOGY | Facility: HOSPITAL | Age: 64
Discharge: HOME | End: 2024-12-11
Payer: COMMERCIAL

## 2024-12-11 DIAGNOSIS — K44.9 HIATAL HERNIA: ICD-10-CM

## 2024-12-11 DIAGNOSIS — R10.13 EPIGASTRIC PAIN: ICD-10-CM

## 2024-12-11 DIAGNOSIS — R17 SERUM TOTAL BILIRUBIN ELEVATED: Primary | ICD-10-CM

## 2024-12-11 LAB
ALBUMIN SERPL BCP-MCNC: 4.4 G/DL (ref 3.4–5)
ALP SERPL-CCNC: 84 U/L (ref 33–136)
ALT SERPL W P-5'-P-CCNC: 34 U/L (ref 10–52)
ANION GAP SERPL CALC-SCNC: 11 MMOL/L (ref 10–20)
AST SERPL W P-5'-P-CCNC: 28 U/L (ref 9–39)
BASOPHILS # BLD AUTO: 0.06 X10*3/UL (ref 0–0.1)
BASOPHILS NFR BLD AUTO: 0.6 %
BILIRUB SERPL-MCNC: 1.4 MG/DL (ref 0–1.2)
BUN SERPL-MCNC: 27 MG/DL (ref 6–23)
CALCIUM SERPL-MCNC: 9.6 MG/DL (ref 8.6–10.3)
CHLORIDE SERPL-SCNC: 103 MMOL/L (ref 98–107)
CO2 SERPL-SCNC: 28 MMOL/L (ref 21–32)
CREAT SERPL-MCNC: 1.25 MG/DL (ref 0.5–1.3)
EGFRCR SERPLBLD CKD-EPI 2021: 65 ML/MIN/1.73M*2
EOSINOPHIL # BLD AUTO: 2.46 X10*3/UL (ref 0–0.7)
EOSINOPHIL NFR BLD AUTO: 22.6 %
ERYTHROCYTE [DISTWIDTH] IN BLOOD BY AUTOMATED COUNT: 13.2 % (ref 11.5–14.5)
GLUCOSE SERPL-MCNC: 109 MG/DL (ref 74–99)
HCT VFR BLD AUTO: 43 % (ref 41–52)
HGB BLD-MCNC: 14.7 G/DL (ref 13.5–17.5)
IMM GRANULOCYTES # BLD AUTO: 0.06 X10*3/UL (ref 0–0.7)
IMM GRANULOCYTES NFR BLD AUTO: 0.6 % (ref 0–0.9)
LIPASE SERPL-CCNC: 50 U/L (ref 9–82)
LYMPHOCYTES # BLD AUTO: 2.38 X10*3/UL (ref 1.2–4.8)
LYMPHOCYTES NFR BLD AUTO: 21.8 %
MCH RBC QN AUTO: 31.7 PG (ref 26–34)
MCHC RBC AUTO-ENTMCNC: 34.2 G/DL (ref 32–36)
MCV RBC AUTO: 93 FL (ref 80–100)
MONOCYTES # BLD AUTO: 1.03 X10*3/UL (ref 0.1–1)
MONOCYTES NFR BLD AUTO: 9.4 %
NEUTROPHILS # BLD AUTO: 4.91 X10*3/UL (ref 1.2–7.7)
NEUTROPHILS NFR BLD AUTO: 45 %
NRBC BLD-RTO: 0 /100 WBCS (ref 0–0)
PLATELET # BLD AUTO: 296 X10*3/UL (ref 150–450)
POTASSIUM SERPL-SCNC: 3.8 MMOL/L (ref 3.5–5.3)
PROT SERPL-MCNC: 7.2 G/DL (ref 6.4–8.2)
RBC # BLD AUTO: 4.64 X10*6/UL (ref 4.5–5.9)
SODIUM SERPL-SCNC: 138 MMOL/L (ref 136–145)
WBC # BLD AUTO: 10.9 X10*3/UL (ref 4.4–11.3)

## 2024-12-11 PROCEDURE — 85025 COMPLETE CBC W/AUTO DIFF WBC: CPT

## 2024-12-11 PROCEDURE — 74022 RADEX COMPL AQT ABD SERIES: CPT

## 2024-12-11 PROCEDURE — 74022 RADEX COMPL AQT ABD SERIES: CPT | Performed by: RADIOLOGY

## 2024-12-11 PROCEDURE — 36415 COLL VENOUS BLD VENIPUNCTURE: CPT

## 2024-12-11 PROCEDURE — 83690 ASSAY OF LIPASE: CPT

## 2024-12-11 PROCEDURE — 80053 COMPREHEN METABOLIC PANEL: CPT

## 2024-12-26 ENCOUNTER — HOSPITAL ENCOUNTER (OUTPATIENT)
Dept: RADIOLOGY | Facility: HOSPITAL | Age: 64
Discharge: HOME | End: 2024-12-26
Payer: COMMERCIAL

## 2024-12-26 DIAGNOSIS — K52.9 POSTPRANDIAL DIARRHEA: ICD-10-CM

## 2024-12-26 DIAGNOSIS — R11.2 NAUSEA AND VOMITING, UNSPECIFIED VOMITING TYPE: ICD-10-CM

## 2024-12-26 DIAGNOSIS — R10.13 POSTPRANDIAL EPIGASTRIC PAIN: ICD-10-CM

## 2024-12-26 DIAGNOSIS — N20.0 KIDNEY STONE: ICD-10-CM

## 2024-12-26 PROCEDURE — 74177 CT ABD & PELVIS W/CONTRAST: CPT

## 2024-12-26 PROCEDURE — A9698 NON-RAD CONTRAST MATERIALNOC: HCPCS | Performed by: FAMILY MEDICINE

## 2024-12-26 PROCEDURE — 2550000001 HC RX 255 CONTRASTS: Performed by: FAMILY MEDICINE

## 2024-12-30 DIAGNOSIS — R35.1 NOCTURIA: ICD-10-CM

## 2025-01-08 ENCOUNTER — APPOINTMENT (OUTPATIENT)
Dept: UROLOGY | Facility: CLINIC | Age: 65
End: 2025-01-08
Payer: COMMERCIAL

## 2025-02-01 DIAGNOSIS — E78.5 HYPERLIPIDEMIA, UNSPECIFIED: ICD-10-CM

## 2025-02-03 RX ORDER — ATORVASTATIN CALCIUM 20 MG/1
TABLET, FILM COATED ORAL
Qty: 90 TABLET | Refills: 1 | Status: SHIPPED | OUTPATIENT
Start: 2025-02-03

## 2025-02-10 ENCOUNTER — TELEPHONE (OUTPATIENT)
Age: 65
End: 2025-02-10
Payer: COMMERCIAL

## 2025-02-10 DIAGNOSIS — R10.13 POSTPRANDIAL EPIGASTRIC PAIN: Primary | ICD-10-CM

## 2025-02-10 NOTE — TELEPHONE ENCOUNTER
LM asking if there was a GI in the [place] or [place] area affiliated with  that JOEMANUEL could refer him to?

## 2025-02-20 ENCOUNTER — APPOINTMENT (OUTPATIENT)
Dept: UROLOGY | Facility: CLINIC | Age: 65
End: 2025-02-20
Payer: COMMERCIAL

## 2025-03-07 DIAGNOSIS — Z00.00 ENCOUNTER FOR GENERAL ADULT MEDICAL EXAMINATION WITHOUT ABNORMAL FINDINGS: ICD-10-CM

## 2025-03-07 RX ORDER — LOSARTAN POTASSIUM 100 MG/1
100 TABLET ORAL DAILY
Qty: 90 TABLET | Refills: 1 | Status: SHIPPED | OUTPATIENT
Start: 2025-03-07

## 2025-04-03 DIAGNOSIS — Z00.00 ENCOUNTER FOR GENERAL ADULT MEDICAL EXAMINATION WITHOUT ABNORMAL FINDINGS: ICD-10-CM

## 2025-04-04 RX ORDER — LOSARTAN POTASSIUM 100 MG/1
100 TABLET ORAL DAILY
Qty: 90 TABLET | Refills: 1 | Status: SHIPPED | OUTPATIENT
Start: 2025-04-04

## 2025-04-11 LAB — PSA SERPL-MCNC: 0.99 NG/ML

## 2025-04-14 ENCOUNTER — APPOINTMENT (OUTPATIENT)
Dept: UROLOGY | Facility: CLINIC | Age: 65
End: 2025-04-14
Payer: COMMERCIAL

## 2025-04-14 VITALS
HEIGHT: 72 IN | SYSTOLIC BLOOD PRESSURE: 156 MMHG | DIASTOLIC BLOOD PRESSURE: 85 MMHG | WEIGHT: 243 LBS | BODY MASS INDEX: 32.91 KG/M2 | HEART RATE: 98 BPM

## 2025-04-14 DIAGNOSIS — R97.20 BPH WITH ELEVATED PSA, WITHOUT LOWER URINARY TRACT SYMPTOMS: Primary | ICD-10-CM

## 2025-04-14 DIAGNOSIS — R97.20 BPH WITH ELEVATED PSA, WITHOUT LOWER URINARY TRACT SYMPTOMS: ICD-10-CM

## 2025-04-14 DIAGNOSIS — N40.0 BPH WITH ELEVATED PSA, WITHOUT LOWER URINARY TRACT SYMPTOMS: ICD-10-CM

## 2025-04-14 DIAGNOSIS — N40.0 BPH WITH ELEVATED PSA, WITHOUT LOWER URINARY TRACT SYMPTOMS: Primary | ICD-10-CM

## 2025-04-14 PROCEDURE — 3077F SYST BP >= 140 MM HG: CPT | Performed by: STUDENT IN AN ORGANIZED HEALTH CARE EDUCATION/TRAINING PROGRAM

## 2025-04-14 PROCEDURE — 3008F BODY MASS INDEX DOCD: CPT | Performed by: STUDENT IN AN ORGANIZED HEALTH CARE EDUCATION/TRAINING PROGRAM

## 2025-04-14 PROCEDURE — 99214 OFFICE O/P EST MOD 30 MIN: CPT | Performed by: STUDENT IN AN ORGANIZED HEALTH CARE EDUCATION/TRAINING PROGRAM

## 2025-04-14 PROCEDURE — 3079F DIAST BP 80-89 MM HG: CPT | Performed by: STUDENT IN AN ORGANIZED HEALTH CARE EDUCATION/TRAINING PROGRAM

## 2025-04-14 PROCEDURE — G2211 COMPLEX E/M VISIT ADD ON: HCPCS | Performed by: STUDENT IN AN ORGANIZED HEALTH CARE EDUCATION/TRAINING PROGRAM

## 2025-04-14 RX ORDER — TADALAFIL 5 MG/1
5 TABLET ORAL DAILY
Qty: 30 TABLET | Refills: 6 | Status: SHIPPED | OUTPATIENT
Start: 2025-04-14 | End: 2025-04-15

## 2025-04-14 ASSESSMENT — PATIENT HEALTH QUESTIONNAIRE - PHQ9
1. LITTLE INTEREST OR PLEASURE IN DOING THINGS: NOT AT ALL
SUM OF ALL RESPONSES TO PHQ9 QUESTIONS 1 AND 2: 0
2. FEELING DOWN, DEPRESSED OR HOPELESS: NOT AT ALL

## 2025-04-14 ASSESSMENT — ENCOUNTER SYMPTOMS
DEPRESSION: 0
OCCASIONAL FEELINGS OF UNSTEADINESS: 0
LOSS OF SENSATION IN FEET: 0

## 2025-04-14 NOTE — PROGRESS NOTES
Patient is here for yearly f/u. Most recent PSA was 0.97 on 1/24. Prior PSA was 0.82 on 1/23... Prior PSA was 1.18 on 1/22. BPH sx are mild and stable...no frequency or urgency..some hesitancy and weak stream in the morning sometimes. Denies dysuria and hematuria.. Nocturia x0-1.. Caffeine does worsen LUTs....Hx of testicular swelling..No bigger or smaller then last time..No pain...he did have a scrotal U/S done 11/19 which did show bilateral hydroceles. ED is mild. . No medication for this.     Most recent PSA is 0.99 (04/2025)

## 2025-04-14 NOTE — PROGRESS NOTES
Subjective   Patient ID: Ludin Hart is a 65 y.o. male    HPI  65 y.o. male who presents for yearly f/u. Most recent PSA was 0.97 on 1/24. Prior PSA was 0.82 on 1/23... Prior PSA was 1.18 on 1/22. BPH sx are mild and stable...no frequency or urgency..some hesitancy and weak stream in the morning sometimes. Denies dysuria and hematuria.. Nocturia x0-1.. Caffeine does worsen LUTs....Hx of testicular swelling..No bigger or smaller then last time..No pain...he did have a scrotal U/S done 11/19 which did show bilateral hydroceles. ED is mild. . No medication for this.      Most recent PSA is 0.99 (04/2025)        Review of Systems    All systems were reviewed. Anything negative was noted in the HPI.    Objective   Physical Exam    General: Well developed, well nourished, alert and cooperative, appears in no acute distress   Eyes: Non-injected conjunctiva, sclera clear, no proptosis   Cardiac: Extremities are warm and well perfused. No edema, cyanosis or pallor   Lungs: Breathing is easy, non-labored. Speaking in clear and complete sentences. Normal diaphragmatic movement   MSK: Ambulatory with steady gait, unassisted   Neuro: Alert and oriented to person, place, and time   Psych: Demonstrates good judgment and reason, without hallucinations, abnormal affect or abnormal behaviors   Skin: No obvious lesions, no rashes       No CVA tenderness bilaterally   No suprapubic pain or discomfort       Past Medical History:   Diagnosis Date    Benign prostatic hyperplasia 2023    Erectile dysfunction 2015    Feeling down 06/12/2023    Hyperlipidemia, unspecified 12/01/2022    Hyperlipemia    Hypertension 2019    Personal history of other specified conditions 06/16/2020    History of exercise intolerance    Posterior vitreous detachment of left eye 09/15/2020    Shortness of breath 06/26/2020    Exertional shortness of breath       Past Surgical History:   Procedure Laterality Date    OTHER SURGICAL HISTORY  11/13/2019    Wrist  surgery    OTHER SURGICAL HISTORY  11/13/2019    Knee arthroscopy    OTHER SURGICAL HISTORY  11/13/2019    Hip replacement    OTHER SURGICAL HISTORY  11/13/2019    Cholecystectomy    OTHER SURGICAL HISTORY  11/13/2019    Cataract surgery    OTHER SURGICAL HISTORY  06/28/2022    Hip replacement       Assessment/Plan   Mild BPH    65 y.o. male who presents for the above condition,  We had a very long and extensive discussion with the patient regarding elevated PSA. I explained to him the pathophysiology, differential diagnosis, risk factor, associated conditions, and management. I explained to him that elevated PSA could be either due to BPH, prostate infection or inflammation or prostate adenocarcinoma.     Today, we had a very long and extensive discussion with the patient regarding the pathophysiology, differential diagnosis, risk factor, associated condition, diagnostic work-up and management of BPH and lower urinary tract symptoms and acute urinary retention. I discussed with the patient the need to check his PSA to assess his prostate cancer risk. We discussed at length the mechanism of action, risk, benefit, adverse events and side effect of Tadalafil 5mg daily. We had another discussion with the patient regarding lifestyle modifications including low fluid intake after 5 PM, timed voiding every 2 hours, and decrease caffeine intake. I discussed with the patient that in case he had an elevated PSA, we will proceed do an MRI of the prostate    Plan:  - Follow-up in 6 months  - Prescribed Cialis 5 mg daily      E&M visit today is associated with current or anticipated ongoing medical care services related to a patient's single, serious condition or a complex condition.     4/14/2025    Scribe Attestation  By signing my name below, IShea Scribe attest that this documentation has been prepared under the direction and in the presence of Dr. Tremayne Fischer.

## 2025-04-15 RX ORDER — TADALAFIL 5 MG/1
5 TABLET ORAL DAILY
Qty: 90 TABLET | Refills: 3 | Status: SHIPPED | OUTPATIENT
Start: 2025-04-15 | End: 2026-04-15

## 2025-05-18 DIAGNOSIS — Z91.89 OTHER SPECIFIED PERSONAL RISK FACTORS, NOT ELSEWHERE CLASSIFIED: ICD-10-CM

## 2025-05-19 RX ORDER — TRIAMTERENE AND HYDROCHLOROTHIAZIDE 37.5; 25 MG/1; MG/1
1 CAPSULE ORAL EVERY MORNING
Qty: 90 CAPSULE | Refills: 1 | Status: SHIPPED | OUTPATIENT
Start: 2025-05-19

## 2025-06-02 DIAGNOSIS — E78.5 HYPERLIPIDEMIA, UNSPECIFIED: ICD-10-CM

## 2025-06-02 RX ORDER — ATORVASTATIN CALCIUM 20 MG/1
20 TABLET, FILM COATED ORAL DAILY
Qty: 90 TABLET | Refills: 0 | Status: SHIPPED | OUTPATIENT
Start: 2025-06-02

## 2025-06-09 ENCOUNTER — APPOINTMENT (OUTPATIENT)
Age: 65
End: 2025-06-09
Payer: COMMERCIAL

## 2025-06-19 LAB
ALBUMIN SERPL-MCNC: 4.6 G/DL (ref 3.6–5.1)
ALP SERPL-CCNC: 68 U/L (ref 35–144)
ALT SERPL-CCNC: 24 U/L (ref 9–46)
ANION GAP SERPL CALCULATED.4IONS-SCNC: 8 MMOL/L (CALC) (ref 7–17)
AST SERPL-CCNC: 25 U/L (ref 10–35)
BILIRUB SERPL-MCNC: 1 MG/DL (ref 0.2–1.2)
BUN SERPL-MCNC: 30 MG/DL (ref 7–25)
CALCIUM SERPL-MCNC: 9.6 MG/DL (ref 8.6–10.3)
CHLORIDE SERPL-SCNC: 104 MMOL/L (ref 98–110)
CO2 SERPL-SCNC: 26 MMOL/L (ref 20–32)
CREAT SERPL-MCNC: 1.13 MG/DL (ref 0.7–1.35)
EGFRCR SERPLBLD CKD-EPI 2021: 73 ML/MIN/1.73M2
EST. AVERAGE GLUCOSE BLD GHB EST-MCNC: 120 MG/DL
EST. AVERAGE GLUCOSE BLD GHB EST-SCNC: 6.6 MMOL/L
GLUCOSE SERPL-MCNC: 105 MG/DL (ref 65–99)
HBA1C MFR BLD: 5.8 %
POTASSIUM SERPL-SCNC: 4.2 MMOL/L (ref 3.5–5.3)
PROT SERPL-MCNC: 7.3 G/DL (ref 6.1–8.1)
SODIUM SERPL-SCNC: 138 MMOL/L (ref 135–146)

## 2025-06-20 ENCOUNTER — APPOINTMENT (OUTPATIENT)
Age: 65
End: 2025-06-20
Payer: COMMERCIAL

## 2025-06-20 VITALS
BODY MASS INDEX: 33.18 KG/M2 | HEIGHT: 72 IN | HEART RATE: 72 BPM | WEIGHT: 245 LBS | DIASTOLIC BLOOD PRESSURE: 78 MMHG | SYSTOLIC BLOOD PRESSURE: 138 MMHG

## 2025-06-20 DIAGNOSIS — E78.49 OTHER HYPERLIPIDEMIA: ICD-10-CM

## 2025-06-20 DIAGNOSIS — N40.0 BENIGN PROSTATIC HYPERPLASIA, UNSPECIFIED WHETHER LOWER URINARY TRACT SYMPTOMS PRESENT: ICD-10-CM

## 2025-06-20 DIAGNOSIS — I10 ESSENTIAL HYPERTENSION: Primary | ICD-10-CM

## 2025-06-20 DIAGNOSIS — R73.03 PREDIABETES: ICD-10-CM

## 2025-06-20 PROCEDURE — 1036F TOBACCO NON-USER: CPT | Performed by: FAMILY MEDICINE

## 2025-06-20 PROCEDURE — 3078F DIAST BP <80 MM HG: CPT | Performed by: FAMILY MEDICINE

## 2025-06-20 PROCEDURE — 99214 OFFICE O/P EST MOD 30 MIN: CPT | Performed by: FAMILY MEDICINE

## 2025-06-20 PROCEDURE — 3008F BODY MASS INDEX DOCD: CPT | Performed by: FAMILY MEDICINE

## 2025-06-20 PROCEDURE — 3075F SYST BP GE 130 - 139MM HG: CPT | Performed by: FAMILY MEDICINE

## 2025-06-20 RX ORDER — MELOXICAM 15 MG/1
15 TABLET ORAL
COMMUNITY
Start: 2025-05-18 | End: 2026-05-18

## 2025-06-20 NOTE — PROGRESS NOTES
Patient presents for periodic surveillance of chronic medical problems.     Subjective  Ludin Hart is a 65 y.o. male who presents for Follow-up.  HPI  No new concerns.  Had colonoscopy and endoscopy for followup of abdominal symptoms last December, no findings to explain, has not happened again, will monitor.  HTN, hyperlipidemia, prediabetes, all stable  On treatment for bph through urology.  Recommend Prevnar 20, consider  at future appt.     Review of Systems   All other systems reviewed and are negative.  .    Allergies[1]    Medications Ordered Prior to Encounter[2]    Problem List[3]    Objective     Visit Vitals  /78 (BP Location: Left arm, Patient Position: Sitting)   Pulse 72      Physical Exam  Vitals and nursing note reviewed.   Constitutional:       General: He is not in acute distress.     Appearance: Normal appearance. He is not toxic-appearing.   HENT:      Head: Normocephalic and atraumatic.   Cardiovascular:      Rate and Rhythm: Normal rate and regular rhythm.      Heart sounds: No murmur heard.  Pulmonary:      Effort: Pulmonary effort is normal.      Breath sounds: Normal breath sounds.   Musculoskeletal:      Cervical back: Neck supple. No rigidity.      Comments:     Skin:     General: Skin is warm and dry.   Neurological:      General: No focal deficit present.      Mental Status: He is alert and oriented to person, place, and time.   Psychiatric:         Mood and Affect: Mood normal.         Behavior: Behavior normal.       Orders Only on 06/18/2025   Component Date Value Ref Range Status    GLUCOSE 06/18/2025 105 (H)  65 - 99 mg/dL Final    Comment:               Fasting reference interval     For someone without known diabetes, a glucose value  between 100 and 125 mg/dL is consistent with  prediabetes and should be confirmed with a  follow-up test.         UREA NITROGEN (BUN) 06/18/2025 30 (H)  7 - 25 mg/dL Final    CREATININE 06/18/2025 1.13  0.70 - 1.35 mg/dL Final    EGFR  06/18/2025 73  > OR = 60 mL/min/1.73m2 Final    SODIUM 06/18/2025 138  135 - 146 mmol/L Final    POTASSIUM 06/18/2025 4.2  3.5 - 5.3 mmol/L Final    CHLORIDE 06/18/2025 104  98 - 110 mmol/L Final    CARBON DIOXIDE 06/18/2025 26  20 - 32 mmol/L Final    ELECTROLYTE BALANCE 06/18/2025 8  7 - 17 mmol/L (calc) Final    CALCIUM 06/18/2025 9.6  8.6 - 10.3 mg/dL Final    PROTEIN, TOTAL 06/18/2025 7.3  6.1 - 8.1 g/dL Final    ALBUMIN 06/18/2025 4.6  3.6 - 5.1 g/dL Final    BILIRUBIN, TOTAL 06/18/2025 1.0  0.2 - 1.2 mg/dL Final    ALKALINE PHOSPHATASE 06/18/2025 68  35 - 144 U/L Final    AST 06/18/2025 25  10 - 35 U/L Final    ALT 06/18/2025 24  9 - 46 U/L Final    HEMOGLOBIN A1c 06/18/2025 5.8 (H)  <5.7 % Final    Comment: For someone without known diabetes, a hemoglobin   A1c value between 5.7% and 6.4% is consistent with  prediabetes and should be confirmed with a   follow-up test.     For someone with known diabetes, a value <7%  indicates that their diabetes is well controlled. A1c  targets should be individualized based on duration of  diabetes, age, comorbid conditions, and other  considerations.     This assay result is consistent with an increased risk  of diabetes.     Currently, no consensus exists regarding use of  hemoglobin A1c for diagnosis of diabetes for children.         eAG (mg/dL) 06/18/2025 120  mg/dL Final    eAG (mmol/L) 06/18/2025 6.6  mmol/L Final         Assessment/Plan   Problem List Items Addressed This Visit       Essential hypertension - Primary    Relevant Orders    CBC and Auto Differential    Comprehensive Metabolic Panel    Lipid Panel    Hemoglobin A1C    Microscopic Only, Urine    Albumin-Creatinine Ratio, Urine Random    Hyperlipidemia    Prediabetes    Relevant Orders    CBC and Auto Differential    Comprehensive Metabolic Panel    Lipid Panel    Hemoglobin A1C    Microscopic Only, Urine    Albumin-Creatinine Ratio, Urine Random     Other Visit Diagnoses         Benign prostatic  hyperplasia, unspecified whether lower urinary tract symptoms present                 Follow up six months, labs prior.  Call concerns.       Janny Gill MD        [1] No Known Allergies  [2]   Current Outpatient Medications on File Prior to Visit   Medication Sig Dispense Refill    atorvastatin (Lipitor) 20 mg tablet Take 1 tablet (20 mg) by mouth once daily. 90 tablet 0    losartan (Cozaar) 100 mg tablet Take 1 tablet (100 mg) by mouth once daily. 90 tablet 1    magnesium oxide 400 mg magnesium capsule Take 1 tablet by mouth once daily.      meloxicam (Mobic) 15 mg tablet Take 1 tablet (15 mg) by mouth once daily.      tadalafil (Cialis) 5 mg tablet Take 1 tablet (5 mg) by mouth once daily. 90 tablet 3    triamcinolone (Kenalog) 0.1 % cream Triamcinolone Acetonide 0.1 % External Cream Quantity: 30 Refills: 0 Start : 14-Nov-2019 Active      triamterene-hydrochlorothiazid (Dyazide) 37.5-25 mg capsule Take 1 capsule by mouth once daily in the morning. 90 capsule 1    zinc acetate 50 mg (zinc) capsule Take 50 mg by mouth once daily.       No current facility-administered medications on file prior to visit.   [3]   Patient Active Problem List  Diagnosis    Essential hypertension    Hyperlipidemia    Prediabetes    Benign prostatic hyperplasia without lower urinary tract symptoms

## 2025-06-30 DIAGNOSIS — N40.0 BPH WITH ELEVATED PSA, WITHOUT LOWER URINARY TRACT SYMPTOMS: ICD-10-CM

## 2025-06-30 DIAGNOSIS — R97.20 BPH WITH ELEVATED PSA, WITHOUT LOWER URINARY TRACT SYMPTOMS: ICD-10-CM

## 2025-06-30 RX ORDER — TADALAFIL 5 MG/1
5 TABLET ORAL DAILY
Qty: 90 TABLET | Refills: 3 | Status: SHIPPED | OUTPATIENT
Start: 2025-06-30 | End: 2025-06-30 | Stop reason: SDUPTHER

## 2025-10-13 ENCOUNTER — APPOINTMENT (OUTPATIENT)
Dept: UROLOGY | Facility: CLINIC | Age: 65
End: 2025-10-13
Payer: COMMERCIAL

## 2025-12-19 ENCOUNTER — APPOINTMENT (OUTPATIENT)
Age: 65
End: 2025-12-19
Payer: COMMERCIAL